# Patient Record
Sex: FEMALE | Race: WHITE
[De-identification: names, ages, dates, MRNs, and addresses within clinical notes are randomized per-mention and may not be internally consistent; named-entity substitution may affect disease eponyms.]

---

## 2018-08-24 ENCOUNTER — HOSPITAL ENCOUNTER (OUTPATIENT)
Dept: HOSPITAL 80 - FCP | Age: 58
End: 2018-08-24
Attending: OBSTETRICS & GYNECOLOGY
Payer: COMMERCIAL

## 2018-08-24 DIAGNOSIS — Z01.818: Primary | ICD-10-CM

## 2018-08-24 NOTE — CPEKG
Test Reason : 

Blood Pressure : ***/*** mmHG

Vent. Rate : 055 BPM     Atrial Rate : 055 BPM

   P-R Int : 124 ms          QRS Dur : 092 ms

    QT Int : 436 ms       P-R-T Axes : 064 072 049 degrees

   QTc Int : 417 ms

 

SINUS RHYTHM

 

Confirmed by Estrada Clark ([id number]) on 8/24/2018 5:12:43 PM

 

Referred By:             Confirmed By:Estrada Clark

## 2018-09-03 NOTE — GHP
HISTORY OF PRESENT ILLNESS:  The patient presented to [facility] in March of 201
8 with a chief complaint of severe bunion deformity, bilateral.  It had been progressive over time.  
She is an extremely healthy, active, athletic individual, 57 years of age.  She still plays volleybal
l.  She walks, hikes, is a , skis on a regular basis during the winter time.  She has tr
ied wider shoes, orthotics, insoles, decreased activity, all of which have helped over the years, but
 at this point, the deformity on both feet is so severe, she can no longer do the level of activity t
hat she wants on a regular basis.



PAST MEDICAL HISTORY:  At 57 years of age, she has no significant medical problems.  She takes no pre
scription medications.  She has had an ACL repair, a tib-fib ORIF fracture, both without complication
 or problems to anesthesia.



ALLERGIES:  She has no known drug allergies.



SOCIAL HISTORY:  Has never used tobacco in any form.  Alcohol use is less than 2 ounces per week.



FAMILY HISTORY:  She has a positive family history of bunion deformity.  Outside of that, she has no 
family history of any unusual or significant health issues.



REVIEW OF SYSTEMS:  At her preop appointment, we discussed risks and complications of the procedure. 
 We discussed her general health.  She denied any recent headaches, vision changes, hearing difficult
ies.  Denied any cardiac arrhythmias, chest pain, shortness of breath, GI distress, neurologic, derma
tologic, or other musculoskeletal problems.  She is generally in excellent health.  She does suffer w
ith some minor knee pain because of the prior ACL injury and some minor arthritis, but still enjoys p
laying ice hockey.



PHYSICAL EXAM:  VASCULAR:  Pulses were within normal limits 2+ over 4 on the dorsal and posterior tib
ial arteries.  Capillary fill is less than 5 seconds to digits x10.  Minimal varicosities and no dimitrios
a to either extremity noted.  NEUROLOGIC:  Sharp, dull, light touch proprioception all intact and sym
metric to the digital level without any abnormalities to either extremity.  SKIN:  Shows normal tempe
rature, texture, and turgor with normal hair distribution down to the digital level.  No fungal infec
tion on the digital nails, very mild dystrophic changes on her digital nails, most likely due to prio
r high levels of activity.  MUSCULOSKELETAL:  Evaluation showed 5/5 manual muscle testing to the ante
rior, lateral, and posterior leg muscles as well as the intrinsic arch muscles with no weakness to ei
ther extremity.  She had pain-free range of motion to the ankle subtalar mid tarsal joints bilateral.
  She had a general splay foot deformity on a moderate to high arch foot type.  She has a medially de
viated 1st metatarsal with laterally deviated hallux bilateral.



IMAGING:  X-rays were taken, show elevated intermetatarsal hallux abductus angles to approximately 12
 degrees, fairly symmetric bilateral with a positional deformity of the hallux bilateral.  Oblique x-
ray shows slight elevatus and again fairly symmetric bilateral.  There is crepitation and pain when p
laced in a corrected position on the great toes bilateral.  The x-ray show only mild narrowing of the
 joint space on the great toe joint bilateral.



ASSESSMENT:  Hallux valgus deformity.  Moderate to severe bilateral.



PLAN:  At this point is to do a modified Jade bunionectomy with osteotomy screw fixation, bilatera
l, and possible Akin osteotomy screw fixation bilateral.  At her preop appointment, we discussed risk
s and complications of the procedure including residual pain and delayed healing.  Patient understood
.  Consent form was signed, witnessed.  The patient was then given oral and written postop instructio
ns along with prescription for Percocet 10/325, #30, one tab p.o. q.4-6 h. p.r.n. pain.  She was give
n a prescription for Phenergan 12.5 mg, #30, one tab p.o. q.8 h. p.r.n. nausea while she is on the Pe
rcocet.  She was given my cell phone number for 24 hour call should she have any problems or question
s preoperatively.  Her prognosis is very good for a rapid recovery being a healthy active 57-year-old
 female.  She will be followed up x3 days postop at [facility].





Job #:  [id number]

## 2018-09-04 ENCOUNTER — HOSPITAL ENCOUNTER (OUTPATIENT)
Dept: HOSPITAL 80 - FSGY | Age: 58
Discharge: HOME | End: 2018-09-04
Attending: PODIATRIST
Payer: COMMERCIAL

## 2018-09-04 VITALS — SYSTOLIC BLOOD PRESSURE: 110 MMHG | DIASTOLIC BLOOD PRESSURE: 88 MMHG

## 2018-09-04 DIAGNOSIS — M21.611: Primary | ICD-10-CM

## 2018-09-04 DIAGNOSIS — M21.612: ICD-10-CM

## 2018-09-04 DIAGNOSIS — M20.11: ICD-10-CM

## 2018-09-04 DIAGNOSIS — M20.12: ICD-10-CM

## 2018-09-04 PROCEDURE — 0QBP0ZZ EXCISION OF LEFT METATARSAL, OPEN APPROACH: ICD-10-PCS | Performed by: PODIATRIST

## 2018-09-04 PROCEDURE — 0QSP0ZZ REPOSITION LEFT METATARSAL, OPEN APPROACH: ICD-10-PCS | Performed by: PODIATRIST

## 2018-09-04 PROCEDURE — 0QBQ0ZZ EXCISION OF RIGHT TOE PHALANX, OPEN APPROACH: ICD-10-PCS | Performed by: PODIATRIST

## 2018-09-04 PROCEDURE — C1713 ANCHOR/SCREW BN/BN,TIS/BN: HCPCS

## 2018-09-04 PROCEDURE — 0QSN0ZZ REPOSITION RIGHT METATARSAL, OPEN APPROACH: ICD-10-PCS | Performed by: PODIATRIST

## 2018-09-04 NOTE — GOP
DATE OF OPERATION:  



SURGEON:  Ronn Matute DPM



ANESTHESIA:  MAC plus a local infiltration of 10 cc 1% lidocaine plain and 10 cc of 0.5% Marcaine oli
in.



ANESTHESIOLOGIST:  Sophie Pate MD



PREOPERATIVE DIAGNOSIS:  Hallux valgus deformity bilateral.



POSTOPERATIVE DIAGNOSIS:  Hallux valgus deformity bilateral.



PROCEDURE PERFORMED:  

1.  Modified Jade bunionectomy with osteotomy screw fixation, bilateral.

2.  Akin osteotomy with screw fixation, bilateral.



FINDINGS:  





ESTIMATED BLOOD LOSS:  Less than 10 cc.



DESCRIPTION OF PROCEDURE:  The patient was taken to the operating room, placed in supine position.  A
fter local Salmon blocks to the great toes of both feet along with MAC anesthesia, both extremities wer
e then elevated, prepped and draped in the usual sterile OR fashion achieving a sterile field about t
he entire distal aspect of the extremity.  



Attention was directed to the dorsal aspect of the right foot.  After elevation and exsanguination, t
he tourniquet was inflated to 225 mmHg on the right foot.  A 3 to 4 inch linear incision was made med
ial to the 1st metatarsophalangeal joint.  Dissection was carried down to the level of the joint caps
ule, taking care to preserve the neurovascular status to the area.  The superficial vessels were clam
ped and bovied as necessary.  The 1st intermetatarsal space was entered via sharp and blunt dissectio
n.  The adductor tendon was identified and released sharply from its attachments into the base of the
 proximal phalanx and into the fibular sesamoidal apparatus.  At this point, I could manually move th
e great toe into a corrected position.  At this point, a capsular incision was made dorsally reflecti
ng the capsule medially and laterally along with the distal 3rd of the periosteum on the 1st metatars
al as well as the base of the proximal phalanx.  A large bony prominence was noted on the medial aspe
ct of the 1st metatarsal.  This was removed utilizing a bone saw.  The 0.035 K-wire was then placed t
hrough the metaphysis of the metatarsal orienting the K-wire such that it would slightly plantar flex
 the metatarsal head upon doing the osteotomy.  At this point, a V or Chevron-type osteotomy was arroyo
ied out with an elongated dorsal wing.  The metatarsal head was shifted laterally, impacted upon itse
lf and held fast with the same 0.035 K-wire as temporary fixation. Utilizing AO technique, a single 2
.4 mm OsteoMed screw was placed through the dorsal wing and retrograded back into the metatarsal with
 good compression noted intraoperatively.  The K-wire was removed from the operative site.  Redundant
 bone was removed medially and rasped smooth utilizing a rotary bur.  The area was flushed copiously 
with dilute antibiotic solution.  The great toe was taken through range of motion.  It was deemed nec
essary to do a secondary procedure as the great toe was still pushing on the 2nd toe slightly.  



At this point, attention was directed to the proximal phalanx on the great toe.  At this point, an ob
lique wedge osteotomy was carried out with the apex proximal lateral.  This wedge of bone, various th
in sliver of bone was removed from the operative site.  The great toe was shifted away from the 2nd t
oe and clamped utilizing a fracture reduction clamp.  Again utilizing AO technique, 2 parallel 2.4 mm
 OsteoMed screws were placed perpendicular to the osteotomy site with excellent compression noted int
raoperatively.  The area was flushed copiously with dilute antibiotic solution.  The great toe was th
en taken through range of motion, deemed to be anatomically aligned.  Redundant capsule over the meta
tarsal head was capsulotomized. Again, the area was flushed copiously with antibiotic solution.  The 
capsule was then reapproximated utilizing 3-0 Vicryl in a simple interrupted suture.  Periosteum was 
reapproximated utilizing 4-0 Vicryl in a simple interrupted suture.  Subcutaneous closure was carried
 out via 4-0 Vicryl in a horizontal mattress suture, and skin closure was carried out via 4-0 Prolene
 in a running subcuticular stitch.  The incision was then reinforced with Mastisol and Steri-Strips. 
A sterile dressing including Adaptic, 4 x 4, Barry, and Coban were placed over the right lower extrem
ity with gentle compression.  The tourniquet was released.  All digits returned to a uniform pink col
or with normal capillary refill less than 5 seconds to digits x5.  The great toe was taken through a 
full range of motion to be sure that it would track in a sagittal plane. 



At this point, attention was directed to the left foot.  Again, the left foot had been elevated, prep
ped and draped in the usual sterile OR fashion also along with the right foot.  The foot was elevated
, exsanguinated and the tourniquet was inflated to 225 mmHg. Again on the left foot tourniquet time w
as less than 50 minutes.  



Attention was directed to the medial aspect of the left great toe where approximately a 3 to 4 inch l
inear incision was made.  Dissection was carried down to the level of the joint capsule.  Care was ta
john to preserve the neurovascular status to the area.  The adductor tendon was identified in the 1st 
intermetatarsal space.  This tendon was released sharply from its attachments into the base of proxim
al phalanx.  At this point, the toe could very easily be manipulated into a corrected position.  A do
rsal capsular incision was carried out reflecting the capsule medially and laterally along with the p
eriosteum on the distal aspect of the metatarsal and proximal aspect of the proximal phalanx.  There 
was a large bony prominence on the medial aspect of the 1st metatarsal head and this was removed util
izing a bone saw.  A 0.035 K-wire was then placed through the metaphysis of the metatarsal orienting 
the K-wire such that it would slightly plantar flex the metatarsal. A V or Chevron-type osteotomy was
 then carried out with the K-wire as an apex for the osteotomy with an elongated dorsal wing. The met
atarsal was shifted laterally, impacted upon itself and held with temporary fixation with the same 0.
035 K-wire.  Utilizing AO technique, a single 2.4 mm OsteoMed screw was placed through the dorsal win
g and retrograded back in the metatarsal shaft with good compression noted intraoperatively.  The K-w
edmund was removed from the operative site.  Redundant bone was removed medially on the metatarsal and r
asped smooth utilizing a rotary bur.  At this point, the great toe was taken through range of motion 
to see if it was anatomically aligned.  It was still touching the 2nd toe, so it was still deviating 
laterally. At this point, attention was directed to the dorsal aspect of the proximal phalanx where a
n Akin osteotomy was carried out.  This was with an oblique wedge osteotomy with the apex proximal la
teral on the proximal phalanx.  A very thin wedge of bone was removed from the operative site closing
 this angular osteotomy.  The great toe was shifted away from the 2nd toe and held fast utilizing a f
racture reduction clamp.  Again utilizing AO technique, 2 parallel 2.4 mm OsteoMed screws were placed
 perpendicular to the osteotomy site with good compression noted intraoperatively.  The great toe was
 then taken through a range of motion, deemed to be anatomically aligned.  Redundant capsule was caps
ulotomized.  The area was flushed copiously with dilute antibiotic solution.  It should be noted that
 redundant bone on the 1st metatarsal was removed and rasped smooth after the metatarsal osteotomy wa
s carried out.  At this point, the great toe was tracking nicely upon removal of excess joint capsule
.  The capsule was then reapproximated after copious flush utilizing 3-0 Vicryl in a simple interrupt
ed and horizontal mattress suture.  Periosteum was reapproximated utilizing 4-0 Vicryl in a horizonta
l mattress and simple interrupted suture.  Subcutaneous closure was carried out via 4-0 Vicryl in a h
orizontal mattress suture.  Skin closure was carried out via 4-0 Prolene in a running subcuticular st
itch.  The incision was reinforced with Mastisol and Steri-Strips. A sterile dressing, Adaptic, 4 x 4
, Barry along with gentle compression with Coban was placed over the left lower extremity.  At this p
oint, the tourniquet was released.  All digits immediately returned to uniform pink color with normal
 capillary refill less than 5 seconds.  The patient went into recovery in a satisfactory state with a
ll vital signs stable.  Her prognosis is very good for rapid recovery.  She will be followed up in 2 
to 3 days at [facility] here in [place] at my office.  She was given my cell sinan
ne number for 24 hour call should she have any problems or questions postoperatively.



COMPLICATIONS:  There were no complications.



DRAINS:  No drains were placed into the operative site.



TOURNIQUET TIME:  There was a tourniquet utilized throughout the procedure.  Tourniquet time on the r
ight foot was just under 1 hour, on the left foot was just under 50 minutes.





Job #:  [id number]

## 2018-09-04 NOTE — PDANEPAE
ANE History of Present Illness





57 year old female for bilateral Bunionectomy.





ANE Past Medical History





- Cardiovascular History


Hx Hypertension: No


Hx Arrhythmias: No


Hx Chest Pain: No


Hx Coronary Artery / Peripheral Vascular Disease: No


Hx CHF / Valvular Disease: No


Hx Palpitations: No





- Pulmonary History


Hx COPD: No


Hx Asthma/Reactive Airway Disease: No


Hx Recent Upper Respiratory Infection: No


Hx Oxygen in Use at Home: No


Hx Sleep Apnea: No


Sleep Apnea Screening Result - Last Documented: Negative





- Neurologic History


Hx Cerebrovascular Accident: No


Hx Seizures: No


Hx Dementia: No





- Endocrine History


Hx Diabetes: No





- Renal History


Hx Renal Disorders: No





- Liver History


Hx Hepatic Disorders: No





- Neurological & Psychiatric Hx


Hx Neurological and Psychiatric Disorders: No





- Cancer History


Hx Cancer: No





- Congenital Disorder History


Hx Congenital Disorders: No





- GI History


Hx Gastrointestinal Disorders: No





- Other Health History


Other Health History: none





- Chronic Pain History


Chronic Pain: No





- Surgical History


Prior Surgeries: 6 surgeries





ANE Review of Systems


Review of systems is: negative


Review of Systems: 








- Exercise capacity


METS (RN): 6 METS





ANE Patient History





- Allergies


Allergies/Adverse Reactions: 








No Known Allergies Allergy (Verified 09/04/18 06:30)


 








- Home Medications


Home Medications: 








NK [No Known Home Meds]  08/13/18 [Last Taken Unknown]








- NPO status


NPO Since - Liquids (Date): 09/03/18


NPO Since - Liquids (Time): 19:00


NPO Since - Solids (Date): 09/03/18


NPO Since - Solids (Time): 22:00





- Smoking Hx


Smoking Status: Never smoked





- Family Anes Hx


Family Hx Anesthesia Complications: none





ANE Labs/Vital Signs





- Vital Signs


Blood Pressure: 139/101


Heart Rate: 67


Respiratory Rate: 15


O2 Sat (%): 95


Height: 172.72 cm


Weight: 61.235 kg





ANE Physical Exam





- Airway


Neck exam: FROM


Mallampati Score: Class 2


Mouth exam: normal dental/mouth exam





- Pulmonary


Pulmonary: no respiratory distress





- Cardiovascular


Cardiovascular: regular rate and rhythym





- ASA Status


ASA Status: I





ANE Anesthesia Plan


Anesthesia Plan: MAC

## 2024-02-16 ENCOUNTER — APPOINTMENT (RX ONLY)
Dept: URBAN - METROPOLITAN AREA CLINIC 12 | Facility: CLINIC | Age: 64
Setting detail: DERMATOLOGY
End: 2024-02-16

## 2024-02-16 DIAGNOSIS — L82.1 OTHER SEBORRHEIC KERATOSIS: ICD-10-CM

## 2024-02-16 DIAGNOSIS — Z71.89 OTHER SPECIFIED COUNSELING: ICD-10-CM

## 2024-02-16 DIAGNOSIS — B07.8 OTHER VIRAL WARTS: ICD-10-CM | Status: INADEQUATELY CONTROLLED

## 2024-02-16 DIAGNOSIS — L81.4 OTHER MELANIN HYPERPIGMENTATION: ICD-10-CM

## 2024-02-16 DIAGNOSIS — D18.0 HEMANGIOMA: ICD-10-CM

## 2024-02-16 DIAGNOSIS — L73.8 OTHER SPECIFIED FOLLICULAR DISORDERS: ICD-10-CM

## 2024-02-16 DIAGNOSIS — Z80.8 FAMILY HISTORY OF MALIGNANT NEOPLASM OF OTHER ORGANS OR SYSTEMS: ICD-10-CM

## 2024-02-16 DIAGNOSIS — D22 MELANOCYTIC NEVI: ICD-10-CM

## 2024-02-16 DIAGNOSIS — M35.2 BEHÇET'S DISEASE: ICD-10-CM | Status: WELL CONTROLLED

## 2024-02-16 PROBLEM — D22.5 MELANOCYTIC NEVI OF TRUNK: Status: ACTIVE | Noted: 2024-02-16

## 2024-02-16 PROBLEM — D18.01 HEMANGIOMA OF SKIN AND SUBCUTANEOUS TISSUE: Status: ACTIVE | Noted: 2024-02-16

## 2024-02-16 PROCEDURE — ? COUNSELING

## 2024-02-16 PROCEDURE — ? LIQUID NITROGEN

## 2024-02-16 PROCEDURE — ? PRESCRIPTION

## 2024-02-16 PROCEDURE — ? PRESCRIPTION MEDICATION MANAGEMENT

## 2024-02-16 PROCEDURE — 17110 DESTRUCTION B9 LES UP TO 14: CPT

## 2024-02-16 PROCEDURE — ? SUNSCREEN RECOMMENDATIONS

## 2024-02-16 PROCEDURE — 99203 OFFICE O/P NEW LOW 30 MIN: CPT | Mod: 25

## 2024-02-16 PROCEDURE — ? SHAVE REMOVAL

## 2024-02-16 PROCEDURE — 11300 SHAVE SKIN LESION 0.5 CM/<: CPT | Mod: 59

## 2024-02-16 RX ORDER — CLOBETASOL PROPIONATE 0.5 MG/G
0.05% CREAM TOPICAL BID PRN
Qty: 45 | Refills: 2 | Status: ERX

## 2024-02-16 ASSESSMENT — LOCATION DETAILED DESCRIPTION DERM
LOCATION DETAILED: SUBXIPHOID
LOCATION DETAILED: SUPERIOR THORACIC SPINE
LOCATION DETAILED: LEFT INFERIOR CENTRAL MALAR CHEEK
LOCATION DETAILED: LEFT ULNAR DORSAL HAND
LOCATION DETAILED: RIGHT POSTERIOR SHOULDER
LOCATION DETAILED: LEFT RIB CAGE
LOCATION DETAILED: LEFT POSTERIOR SHOULDER
LOCATION DETAILED: INFERIOR THORACIC SPINE
LOCATION DETAILED: UPPER STERNUM
LOCATION DETAILED: EPIGASTRIC SKIN
LOCATION DETAILED: INFERIOR MID FOREHEAD

## 2024-02-16 ASSESSMENT — LOCATION ZONE DERM
LOCATION ZONE: HAND
LOCATION ZONE: FACE
LOCATION ZONE: TRUNK
LOCATION ZONE: ARM

## 2024-02-16 ASSESSMENT — LOCATION SIMPLE DESCRIPTION DERM
LOCATION SIMPLE: INFERIOR FOREHEAD
LOCATION SIMPLE: LEFT HAND
LOCATION SIMPLE: LEFT CHEEK
LOCATION SIMPLE: UPPER BACK
LOCATION SIMPLE: RIGHT SHOULDER
LOCATION SIMPLE: CHEST
LOCATION SIMPLE: ABDOMEN
LOCATION SIMPLE: LEFT SHOULDER

## 2024-02-16 ASSESSMENT — PAIN INTENSITY VAS: HOW INTENSE IS YOUR PAIN 0 BEING NO PAIN, 10 BEING THE MOST SEVERE PAIN POSSIBLE?: NO PAIN

## 2024-02-16 NOTE — PROCEDURE: LIQUID NITROGEN
Duration Of Freeze Thaw-Cycle (Seconds): 5-10
Show Spray Paint Technique Variable?: Yes
Add 52 Modifier (Optional): no
Post-Care Instructions: I reviewed with the patient in detail post-care instructions. Patient is to wear sunprotection, and avoid picking at any of the treated lesions. Pt may apply Vaseline to crusted or scabbing areas.
Detail Level: Simple
Medical Necessity Clause: This procedure was medically necessary because the lesions that were treated were:
Spray Paint Text: The liquid nitrogen was applied to the skin utilizing a spray paint frosting technique.
Number Of Freeze-Thaw Cycles: 3 freeze-thaw cycles
Consent: The patient's verbal consent was obtained including but not limited to risks of crusting, scabbing, blistering, scarring, darker or lighter pigmentary change, recurrence, incomplete removal and infection.
Medical Necessity Information: It is in your best interest to select a reason for this procedure from the list below. All of these items fulfill various CMS LCD requirements except the new and changing color options.

## 2024-02-16 NOTE — PROCEDURE: PRESCRIPTION MEDICATION MANAGEMENT
Render In Strict Bullet Format?: No
Detail Level: Zone
Continue Regimen: Clobetasol 0.05% cream BID PRN flares

## 2024-02-16 NOTE — PROCEDURE: SHAVE REMOVAL
Medical Necessity Information: It is in your best interest to select a reason for this procedure from the list below. All of these items fulfill various CMS LCD requirements except the new and changing color options.
Medical Necessity Clause: This procedure was medically necessary because the lesion that was treated was:
Lab: [id number]
Lab Facility: [id number]
Detail Level: Detailed
Was A Bandage Applied: Yes
Size Of Lesion In Cm (Required): 0.3
X Size Of Lesion In Cm (Optional): 0
Depth Of Shave: dermis
Biopsy Method: Dermablade
Anesthesia Type: 1% lidocaine without epinephrine
Anesthesia Volume In Cc: 0.5
Hemostasis: Aluminum Chloride
Wound Care: Petrolatum
Render Path Notes In Note?: No
Triangulation (Location Of Lesion In Relation To Distance From Anatomical Landmarks): 3mm
Consent: Verbal Consent was obtained from the patient. The risks and benefits to therapy were discussed in detail. Specifically, the risks of infection, scarring, bleeding, prolonged wound healing, incomplete removal, allergy to anesthesia, nerve injury and recurrence were addressed. Prior to the procedure, the treatment site was clearly identified and confirmed by the patient. All components of Universal Protocol/PAUSE Rule completed.
Post-Care Instructions: I reviewed with the patient in detail post-care instructions. Patient is to keep the biopsy site dry overnight, and then apply Vaseline twice daily until healed.
Notification Instructions: Patient will be notified of pathology results. However, patient instructed to call the office if not contacted within 2 weeks.
Billing Type: Third-Party Bill

## 2025-01-09 ENCOUNTER — APPOINTMENT (OUTPATIENT)
Dept: URBAN - METROPOLITAN AREA CLINIC 134 | Facility: CLINIC | Age: 65
Setting detail: DERMATOLOGY
End: 2025-01-09

## 2025-01-09 DIAGNOSIS — D22 MELANOCYTIC NEVI: ICD-10-CM

## 2025-01-09 DIAGNOSIS — L82.0 INFLAMED SEBORRHEIC KERATOSIS: ICD-10-CM

## 2025-01-09 DIAGNOSIS — L82.1 OTHER SEBORRHEIC KERATOSIS: ICD-10-CM

## 2025-01-09 PROBLEM — D22.62 MELANOCYTIC NEVI OF LEFT UPPER LIMB, INCLUDING SHOULDER: Status: ACTIVE | Noted: 2025-01-09

## 2025-01-09 PROCEDURE — 17110 DESTRUCTION B9 LES UP TO 14: CPT

## 2025-01-09 PROCEDURE — 99212 OFFICE O/P EST SF 10 MIN: CPT | Mod: 25

## 2025-01-09 PROCEDURE — ? LIQUID NITROGEN

## 2025-01-09 PROCEDURE — ? SHAVE REMOVAL

## 2025-01-09 PROCEDURE — 11300 SHAVE SKIN LESION 0.5 CM/<: CPT | Mod: 59

## 2025-01-09 PROCEDURE — ? COUNSELING

## 2025-01-09 ASSESSMENT — PAIN INTENSITY VAS: HOW INTENSE IS YOUR PAIN 0 BEING NO PAIN, 10 BEING THE MOST SEVERE PAIN POSSIBLE?: NO PAIN

## 2025-01-09 ASSESSMENT — LOCATION ZONE DERM
LOCATION ZONE: ARM
LOCATION ZONE: NECK
LOCATION ZONE: LEG

## 2025-01-09 ASSESSMENT — LOCATION DETAILED DESCRIPTION DERM
LOCATION DETAILED: LEFT POSTERIOR SHOULDER
LOCATION DETAILED: LEFT ANTERIOR PROXIMAL THIGH
LOCATION DETAILED: RIGHT INFERIOR LATERAL NECK

## 2025-01-09 ASSESSMENT — LOCATION SIMPLE DESCRIPTION DERM
LOCATION SIMPLE: LEFT SHOULDER
LOCATION SIMPLE: RIGHT ANTERIOR NECK
LOCATION SIMPLE: LEFT THIGH

## 2025-01-09 NOTE — HPI: SKIN LESION
Is This A New Presentation, Or A Follow-Up?: Skin Lesions
What Type Of Note Output Would You Prefer (Optional)?: Standard Output
How Severe Is Your Skin Lesion?: moderate
Has Your Skin Lesion Been Treated?: not been treated
Additional History: Patient states she would like spots on her neck and shoulder removed due to irritation and getting caught on clothing and jewelry

## 2025-01-09 NOTE — PROCEDURE: SHAVE REMOVAL
Medical Necessity Information: It is in your best interest to select a reason for this procedure from the list below. All of these items fulfill various CMS LCD requirements except the new and changing color options.
Medical Necessity Clause: This procedure was medically necessary because the lesion that was treated was: irritated
Lab: [id number]
Lab Facility: [id number]
Detail Level: Detailed
Was A Bandage Applied: Yes
Size Of Lesion In Cm (Required): 0.4
X Size Of Lesion In Cm (Optional): 0.3
Depth Of Shave: dermis
Biopsy Method: Dermablade
Anesthesia Type: 1% lidocaine without epinephrine
Hemostasis: Drysol
Wound Care: Petrolatum
Render Path Notes In Note?: No
Triangulation (Location Of Lesion In Relation To Distance From Anatomical Landmarks): 0.4x0.3
Consent was obtained from the patient. The risks and benefits to therapy were discussed in detail. Specifically, the risks of infection, scarring, bleeding, prolonged wound healing, incomplete removal, allergy to anesthesia, nerve injury and recurrence were addressed. Prior to the procedure, the treatment site was clearly identified and confirmed by the patient. All components of Universal Protocol/PAUSE Rule completed.
Post-Care Instructions: I reviewed with the patient in detail post-care instructions. Patient is to keep the biopsy site dry overnight, and then clean with mild soap and water, apply vaseline or aquaphor, and cover with bandage daily until healed. Patient may do white vinegar soaks to remove any crusting.
Notification Instructions: Patient will be notified of pathology results. However, patient instructed to call the office if not contacted within 2 weeks.
Billing Type: Third-Party Bill

## 2025-01-09 NOTE — PROCEDURE: LIQUID NITROGEN
Routine Visit Note:    Skill/education provided: cardiopulmonary assessment, med education, pain assessment, fall prevention education,    Patient/caregiver response: pt stated understanding    Plan for next visit:cardiopulmonary assessment, med education, pain assessmnet, fall prevention education, labs per md order    Other pertinent info: monitor for nausea and vomiting.
Spray Paint Text: The liquid nitrogen was applied to the skin utilizing a spray paint frosting technique.
Show Applicator Variable?: Yes
Detail Level: Detailed
Include Z78.9 (Other Specified Conditions Influencing Health Status) As An Associated Diagnosis?: No
Post-Care Instructions: I reviewed with the patient in detail post-care instructions. Patient is to wear sunprotection, and avoid picking at any of the treated lesions. Pt may apply Vaseline to crusted or scabbing areas.
Duration Of Freeze Thaw-Cycle (Seconds): 2
Consent: The patient's verbal consent was obtained including but not limited to risks of crusting, scabbing, blistering, scarring, darker or lighter pigmentary change, recurrence, incomplete removal and infection.
Medical Necessity Clause: This procedure was medically necessary because the lesions that were treated were:
Number Of Freeze-Thaw Cycles: 2 freeze-thaw cycles
Medical Necessity Information: It is in your best interest to select a reason for this procedure from the list below. All of these items fulfill various CMS LCD requirements except the new and changing color options.